# Patient Record
Sex: FEMALE | Race: BLACK OR AFRICAN AMERICAN | Employment: OTHER | URBAN - METROPOLITAN AREA
[De-identification: names, ages, dates, MRNs, and addresses within clinical notes are randomized per-mention and may not be internally consistent; named-entity substitution may affect disease eponyms.]

---

## 2017-03-08 ENCOUNTER — OFFICE VISIT (OUTPATIENT)
Dept: NEUROLOGY | Age: 72
End: 2017-03-08

## 2017-03-08 VITALS
OXYGEN SATURATION: 98 % | BODY MASS INDEX: 22.24 KG/M2 | WEIGHT: 138.4 LBS | SYSTOLIC BLOOD PRESSURE: 122 MMHG | HEART RATE: 73 BPM | DIASTOLIC BLOOD PRESSURE: 68 MMHG | HEIGHT: 66 IN | RESPIRATION RATE: 16 BRPM

## 2017-03-08 DIAGNOSIS — I61.1 NONTRAUMATIC CORTICAL HEMORRHAGE OF LEFT CEREBRAL HEMISPHERE (HCC): Primary | ICD-10-CM

## 2017-03-08 DIAGNOSIS — G40.209 COMPLEX PARTIAL SEIZURE EVOLVING TO GENERALIZED SEIZURE (HCC): ICD-10-CM

## 2017-03-08 DIAGNOSIS — G40.009 LOCALIZATION-RELATED IDIOPATHIC EPILEPSY AND EPILEPTIC SYNDROMES WITH SEIZURES OF LOCALIZED ONSET, NOT INTRACTABLE, WITHOUT STATUS EPILEPTICUS (HCC): ICD-10-CM

## 2017-03-08 NOTE — MR AVS SNAPSHOT
Visit Information Date & Time Provider Department Dept. Phone Encounter #  
 3/8/2017 12:30 PM Tavares Goldman MD [facility] [phone] [id number] Follow-up Instructions Return in about 6 months (around 9/8/2017). Follow-up and Disposition History Your Appointments 1/2/2018  1:00 PM  
PROCEDURE with DOPPLER_NEUMR [facility] [address]) Appt Note: doppler tas patient [facility], 
[address], [address] [place] [address]  
[address], [address], [address] [place] [address]  
  
    
 7/9/2018  3:20 PM  
Follow Up with Tavares Goldman MD  
[facility] [address]) Appt Note: f/u seizure, jrb 12/29/17  
 [facility], 
[address], [address] [place] [address]  
[address], [address] [place] [address] Avenue, [address] [place] [address] Upcoming Health Maintenance Date Due DTaP/Tdap/Td series (1 - Tdap) 1/3/1964 FOBT Q 1 YEAR AGE 50-75 1/3/1993 ZOSTER VACCINE AGE 60> 11/3/2002 GLAUCOMA SCREENING Q2Y 1/3/2008 OSTEOPOROSIS SCREENING (DEXA) 1/3/2008 MEDICARE YEARLY EXAM 1/3/2008 Pneumococcal 65+ Low/Medium Risk (2 of 2 - PCV13) 2/27/2014 Influenza Age 5 to Adult 8/1/2017 Allergies as of 3/8/2017  Review Complete On: 3/8/2017 By: Apryl Retana No Known Allergies Current Immunizations  Reviewed on 3/3/2013 Name Date Pneumococcal Polysaccharide (PPSV-23) 2/27/2013  6:14 PM  
  
 Not reviewed this visit You Were Diagnosed With   
  
 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  
 122/68 73 16 5' 6\" (1.676 m) 138 lb 6.4 oz (62.8 kg) 98% BMI Smoking Status 22.34 kg/m2 Never Smoker Vitals History BMI and BSA Data Body Mass Index Body Surface Area  
 22.34 kg/m 2 1.71 m 2 Preferred Pharmacy Pharmacy Name Phone [address] [facility], [place] [address] Your Updated Medication List  
  
   
This list is accurate as of: 3/8/17 11:59 PM.  Always use your most recent med list.  
  
  
  
  
 atorvastatin 10 mg tablet Commonly known as:  LIPITOR Take 10 mg by mouth daily. HYDROCHLOROTHIAZIDE PO Take 25 mg by mouth.  
  
 levETIRAcetam 1,000 mg tablet Take 1 Tab by mouth two (2) times a day. losartan 50 mg tablet Commonly known as:  COZAAR Take 50 mg by mouth daily. metoprolol succinate 25 mg XL tablet Commonly known as:  TOPROL-XL Take 25 mg by mouth daily. We Performed the Following LEVETIRACETAM (KEPPRA) [id number] CPT(R)] Follow-up Instructions Return in about 6 months (around 9/8/2017). Patient Instructions A Healthy Lifestyle: Care Instructions Your Care Instructions A healthy lifestyle can help you feel good, stay at a healthy weight, and have plenty of energy for both work and play. A healthy lifestyle is something you can share with your whole family. A healthy lifestyle also can lower your risk for serious health problems, such as high blood pressure, heart disease, and diabetes. You can follow a few steps listed below to improve your health and the health of your family. Follow-up care is a key part of your treatment and safety. Be sure to make and go to all appointments, and call your doctor if you are having problems. Its also a good idea to know your test results and keep a list of the medicines you take. How can you care for yourself at home? · Do not eat too much sugar, fat, or fast foods. You can still have dessert and treats now and then. The goal is moderation. · Start small to improve your eating habits. Pay attention to portion sizes, drink less juice and soda pop, and eat more fruits and vegetables. ¨ Eat a healthy amount of food. A 3-ounce serving of meat, for example, is about the size of a deck of cards. Fill the rest of your plate with vegetables and whole grains. ¨ Limit the amount of soda and sports drinks you have every day. Drink more water when you are thirsty. ¨ Eat at least 5 servings of fruits and vegetables every day. It may seem like a lot, but it is not hard to reach this goal. A serving or helping is 1 piece of fruit, 1 cup of vegetables, or 2 cups of leafy, raw vegetables. Have an apple or some carrot sticks as an afternoon snack instead of a candy bar. Try to have fruits and/or vegetables at every meal. 
· Make exercise part of your daily routine. You may want to start with simple activities, such as walking, bicycling, or slow swimming. Try to be active 30 to 60 minutes every day. You do not need to do all 30 to 60 minutes all at once. For example, you can exercise 3 times a day for 10 or 20 minutes. Moderate exercise is safe for most people, but it is always a good idea to talk to your doctor before starting an exercise program. 
· Keep moving. Nixon Jayla the lawn, work in the garden, or [employer]. Take the stairs instead of the elevator at work. · If you smoke, quit. People who smoke have an increased risk for heart attack, stroke, cancer, and other lung illnesses.  Quitting is hard, but there are ways to boost your chance of quitting tobacco for good. ¨ Use nicotine gum, patches, or lozenges. ¨ Ask your doctor about stop-smoking programs and medicines. ¨ Keep trying. In addition to reducing your risk of diseases in the future, you will notice some benefits soon after you stop using tobacco. If you have shortness of breath or asthma symptoms, they will likely get better within a few weeks after you quit. · Limit how much alcohol you drink. Moderate amounts of alcohol (up to 2 drinks a day for men, 1 drink a day for women) are okay. But drinking too much can lead to liver problems, high blood pressure, and other health problems. Family health If you have a family, there are many things you can do together to improve your health. · Eat meals together as a family as often as possible. · Eat healthy foods. This includes fruits, vegetables, lean meats and dairy, and whole grains. · Include your family in your fitness plan. Most people think of activities such as jogging or tennis as the way to fitness, but there are many ways you and your family can be more active. Anything that makes you breathe hard and gets your heart pumping is exercise. Here are some tips: 
¨ Walk to do errands or to take your child to school or the bus. ¨ Go for a family bike ride after dinner instead of watching TV. Where can you learn more? Go to http://nic-juan carlos.info/. Enter C533 in the search box to learn more about \"A Healthy Lifestyle: Care Instructions. \" Current as of: July 26, 2016 Content Version: 11.1 © 9369-0976 Healthwise, Incorporated. Care instructions adapted under license by [employer] (which disclaims liability or warranty for this information). If you have questions about a medical condition or this instruction, always ask your healthcare professional. [place] [address] any warranty or liability for your use of this information. Introducing [facility]! Emerson John introduces Realius patient portal. Now you can access parts of your medical record, email your doctor's office, and request medication refills online. 1. In your internet browser, go to [email or web address] 2. Click on the First Time User? Click Here link in the Sign In box. You will see the New Member Sign Up page. 3. Enter your Realius Access Code exactly as it appears below. You will not need to use this code after youve completed the sign-up process. If you do not sign up before the expiration date, you must request a new code. · Realius Access Code: [email or web address] Expires: 3/29/2018  2:47 PM 
 
4. Enter the last four digits of your Social Security Number (xxxx) and Date of Birth (mm/dd/yyyy) as indicated and click Submit. You will be taken to the next sign-up page. 5. Create a Realius ID. This will be your Realius login ID and cannot be changed, so think of one that is secure and easy to remember. 6. Create a Realius password. You can change your password at any time. 7. Enter your Password Reset Question and Answer. This can be used at a later time if you forget your password. 8. Enter your e-mail address. You will receive e-mail notification when new information is available in [address] 9. Click Sign Up. You can now view and download portions of your medical record. 10. Click the Download Summary menu link to download a portable copy of your medical information. If you have questions, please visit the Frequently Asked Questions section of the Realius website. Remember, Realius is NOT to be used for urgent needs. For medical emergencies, dial 911. Now available from your iPhone and Android! Please provide this summary of care documentation to your next provider. Your primary care clinician is listed as Phys Other. If you have any questions after today's visit, please call [phone].

## 2017-03-08 NOTE — PATIENT INSTRUCTIONS

## 2017-03-08 NOTE — PROGRESS NOTES
Consult  REFERRED BY:  Minor Foster MD    CHIEF COMPLAINT: Seizures      Subjective:     Keysha Winn is a 74 y.o. right-handed -American female seen today for evaluation of new problem of increasing lethargy, slight confusion, feeling a little drowsy and sedated and she wonders whether or not it might be her medicine. 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 She was on 500 mg twice a day. She had a seizure in 2013 after she had a hypertensive intracranial hemorrhage requiring craniotomy and removal of the hematoma. She has a persistent right hemiparesis since then. She had the focal seizures noted then. Her blood pressure has been doing much better and she has had no recurrence of hemorrhages, and she denies any new focal weakness sensory loss visual changes. She has not had any levels done on her Keppra, but the level last May was a little elevated in the 40s, and I think that was before we increased her dose. We need to recheck her levels today to see whether or not she is toxic on her medication. Again she has had no further seizures since last May on the dose of medication now. She has had no other unusual medical problems or illnesses since then either. Past Medical History:   Diagnosis Date    Hypertension     [facility]intrace[facility] 2/23/2013      History reviewed. No pertinent surgical history. Family History   Problem Relation Age of Onset    Stroke Mother     Stroke Sister     Dementia Maternal Aunt       Social History   Substance Use Topics    Smoking status: Never Smoker    Smokeless tobacco: Not on file    Alcohol use Not on file         Current Outpatient Prescriptions:     levETIRAcetam 1,000 mg tablet, Take 1 Tab by mouth two (2) times a day., Disp: 60 Tab, Rfl: 0    losartan (COZAAR) 50 mg tablet, Take 50 mg by mouth daily. , Disp: , Rfl:     atorvastatin (LIPITOR) 10 mg tablet, Take 10 mg by mouth daily. , Disp: , Rfl:     metoprolol succinate (TOPROL-XL) 25 mg XL tablet, Take 25 mg by mouth daily. , Disp: , Rfl:     HYDROCHLOROTHIAZIDE PO, Take 25 mg by mouth., Disp: , Rfl:         No Known Allergies     Review of Systems:  A comprehensive review of systems was negative except for: Constitutional: positive for fatigue and malaise  Musculoskeletal: positive for myalgias, arthralgias and stiff joints  Neurological: positive for seizures, coordination problems, gait problems and weakness   Vitals:    03/08/17 1233   BP: 122/68   Pulse: 73   Resp: 16   SpO2: 98%   Weight: 138 lb 6.4 oz (62.8 kg)   Height: 5' 6\" (1.676 m)     Objective:     I      NEUROLOGICAL EXAM:    Appearance: The patient is well developed, well nourished, provides a coherent history and is in no acute distress. Mental Status: Oriented to time, place and person, and the president, cognitive function is normal for her age and speech is fluent and no aphasia or dysarthria. Mood and affect appropriate. Cranial Nerves:   Intact visual fields. Fundi are benign. THERESE, EOM's full, no nystagmus, no ptosis. Facial sensation is normal. Corneal reflexes are not tested. Facial movement is symmetric. Hearing is normal bilaterally. Palate is midline with normal sternocleidomastoid and trapezius muscles are normal. Tongue is midline. Neck without meningismus or bruits   Motor:  4/5 strength in upper and lower proximal and distal muscles on the left, and 3/5 on the right. Normal bulk and tone on the left, and increased muscle tone with normal bulk on the right. No fasciculations. Reflexes:   Deep tendon reflexes 2+/4 on the right and 1+/4 on the left. No babinski or clonus present   Sensory:   Normal to touch, pinprick and vibration. DSS is intact   Gait:  Abnormal gait as patient walks with a mild spastic right hemiparesis . Tremor:   No tremor noted.    Cerebellar:  No cerebellar signs present. Neurovascular:  Normal heart sounds and regular rhythm, peripheral pulses decreased, and no carotid bruits. Assessment:       ICD-10-CM ICD-9-CM    1. Nontraumatic cortical hemorrhage of left cerebral hemisphere (HCC) I61.1 431 LEVETIRACETAM (KEPPRA)   2. 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 She will continue other medication because she is doing well there. She will check my chart for results of her test, and call us if there is any question. She is encouraged to take her vitamins and vitamin D every day, remain mentally and physically active. We discussed her condition with the patient and her  in detail, chart reviewed, x-rays reviewed, her labs reviewed, and I concluded that there is a real high chance she is probably toxic on Keppra, and needs her medication readjusted  At the levels are not elevated we may need to do other testing because of her symptoms and may be reimage the brain. Follow-up in 3-6 months time or earlier as needed. Signed By: Danielle Briseno MD     March 8, 2017       CC: Minor Foster MD  FAX: None    This note will not be viewable in [address]

## 2017-03-20 LAB — LEVETIRACETAM SERPL-MCNC: 77.8 UG/ML (ref 10–40)

## 2017-09-11 ENCOUNTER — TELEPHONE (OUTPATIENT)
Dept: NEUROLOGY | Age: 72
End: 2017-09-11

## 2017-09-11 NOTE — TELEPHONE ENCOUNTER
Patient was rescheduled as Dr Vinny Wyatt had to cancel the appointment as he will be out of the office that day

## 2017-12-29 ENCOUNTER — OFFICE VISIT (OUTPATIENT)
Dept: NEUROLOGY | Age: 72
End: 2017-12-29

## 2017-12-29 VITALS
WEIGHT: 117 LBS | HEART RATE: 63 BPM | TEMPERATURE: 98 F | DIASTOLIC BLOOD PRESSURE: 60 MMHG | RESPIRATION RATE: 16 BRPM | BODY MASS INDEX: 18.8 KG/M2 | OXYGEN SATURATION: 97 % | SYSTOLIC BLOOD PRESSURE: 128 MMHG | HEIGHT: 66 IN

## 2017-12-29 DIAGNOSIS — R41.3 DISTURBANCE OF MEMORY: ICD-10-CM

## 2017-12-29 DIAGNOSIS — R41.82 ALTERED MENTAL STATUS, UNSPECIFIED ALTERED MENTAL STATUS TYPE: ICD-10-CM

## 2017-12-29 DIAGNOSIS — G40.009 LOCALIZATION-RELATED IDIOPATHIC EPILEPSY AND EPILEPTIC SYNDROMES WITH SEIZURES OF LOCALIZED ONSET, NOT INTRACTABLE, WITHOUT STATUS EPILEPTICUS (HCC): ICD-10-CM

## 2017-12-29 DIAGNOSIS — E55.9 VITAMIN D DEFICIENCY: ICD-10-CM

## 2017-12-29 DIAGNOSIS — E53.8 B12 DEFICIENCY: ICD-10-CM

## 2017-12-29 DIAGNOSIS — G40.209 COMPLEX PARTIAL SEIZURE EVOLVING TO GENERALIZED SEIZURE (HCC): ICD-10-CM

## 2017-12-29 DIAGNOSIS — I61.1 NONTRAUMATIC CORTICAL HEMORRHAGE OF LEFT CEREBRAL HEMISPHERE (HCC): Primary | ICD-10-CM

## 2017-12-29 DIAGNOSIS — E03.4 HYPOTHYROIDISM DUE TO ACQUIRED ATROPHY OF THYROID: ICD-10-CM

## 2017-12-29 DIAGNOSIS — I65.23 BILATERAL CAROTID ARTERY STENOSIS: ICD-10-CM

## 2017-12-29 DIAGNOSIS — G45.1 TRANSIENT ISCHEMIC ATTACK INVOLVING LEFT INTERNAL CAROTID ARTERY: ICD-10-CM

## 2017-12-29 DIAGNOSIS — I63.9 CEREBROVASCULAR ACCIDENT (CVA), UNSPECIFIED MECHANISM (HCC): ICD-10-CM

## 2017-12-29 RX ORDER — ASPIRIN 81 MG/1
TABLET ORAL DAILY
COMMUNITY

## 2017-12-29 NOTE — MR AVS SNAPSHOT
Visit Information Date & Time Provider Department Dept. Phone Encounter #  
 12/29/2017  3:40 PM You Negrete MD [facility] [phone] [id number] Follow-up Instructions Return in about 6 months (around 6/29/2018). Upcoming Health Maintenance Date Due DTaP/Tdap/Td series (1 - Tdap) 1/3/1964 FOBT Q 1 YEAR AGE 50-75 1/3/1993 ZOSTER VACCINE AGE 60> 11/3/2002 GLAUCOMA SCREENING Q2Y 1/3/2008 OSTEOPOROSIS SCREENING (DEXA) 1/3/2008 MEDICARE YEARLY EXAM 1/3/2008 Pneumococcal 65+ Low/Medium Risk (2 of 2 - PCV13) 2/27/2014 Influenza Age 5 to Adult 8/1/2017 Allergies as of 12/29/2017  Review Complete On: 3/8/2017 By: Ras Teresa No Known Allergies Current Immunizations  Reviewed on 3/3/2013 Name Date Pneumococcal Polysaccharide (PPSV-23) 2/27/2013  6:14 PM  
  
 Not reviewed this visit You Were Diagnosed With   
  
 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 
ICD-9-CM: 780.97 Disturbance of memory     ICD-10-CM: R41.3 ICD-9-CM: 780.93 Cerebrovascular accident (CVA), unspecified mechanism ([facility]     ICD-10-CM: I63.9 ICD-9-CM: 434.91 Transient ischemic attack involving left internal carotid artery     ICD-10-CM: G45.1 ICD-9-CM: 435.8 Bilateral carotid artery stenosis     ICD-10-CM: I65.23 ICD-9-CM: 433.10, 433.30 Hypothyroidism due to acquired atrophy of thyroid     ICD-10-CM: E03.4 ICD-9-CM: 244.8, 246.8 Vitamin D deficiency     ICD-10-CM: E55.9 ICD-9-CM: 268.9 B12 deficiency     ICD-10-CM: E53.8 ICD-9-CM: 266.2 Vitals BP Pulse Temp Resp Height(growth percentile) Weight(growth percentile) 128/60 63 98 °F (36.7 °C) 16 5' 6\" (1.676 m) 117 lb (53.1 kg) SpO2 BMI Smoking Status 97% 18.88 kg/m2 Never Smoker Vitals History BMI and BSA Data Body Mass Index Body Surface Area  
 18.88 kg/m 2 1.57 m 2 Preferred Pharmacy Pharmacy Name Phone [address] [facility], [place] [address] Your Updated Medication List  
  
   
This list is accurate as of: 12/29/17  4:04 PM.  Always use your most recent med list.  
  
  
  
  
 aspirin delayed-release 81 mg tablet Take  by mouth daily. atorvastatin 10 mg tablet Commonly known as:  LIPITOR Take 10 mg by mouth daily. HYDROCHLOROTHIAZIDE PO Take 25 mg by mouth.  
  
 levETIRAcetam 1,000 mg tablet Take 1 Tab by mouth two (2) times a day. losartan 50 mg tablet Commonly known as:  COZAAR Take 50 mg by mouth daily. metoprolol succinate 25 mg XL tablet Commonly known as:  TOPROL-XL Take 25 mg by mouth daily. VITAMIN B-12 PO Take  by mouth every seven (7) days. We Performed the Following LEVETIRACETAM (KEPPRA) [id number] CPT(R)] REFERRAL TO PSYCHOLOGY [YFI30 Custom] Comments:  
 Needs neuropsych testing for memory loss TSH 3RD GENERATION [67237 CPT(R)] VITAMIN B12 & FOLATE [26778 CPT(R)] VITAMIN D, 25 HYROXY PANEL [RHG30389 Custom] Follow-up Instructions Return in about 6 months (around 6/29/2018). To-Do List   
 01/04/2018 Imaging:  DUPLEX CAROTID BILATERAL AMB NEURO   
  
 01/08/2018 Neurology:  NEURO EEG 24 HR Referral Information Referral ID Referred By Referred To  
  
 [id number] [address] [place] [address], PHD   
   [address] [place] [address] [address] [place] Phone: [phone] Fax: [phone] Visits Status Start Date End Date 1 New Request 12/29/17 12/29/18 If your referral has a status of pending review or denied, additional information will be sent to support the outcome of this decision. Patient Instructions Please be advised there is a $25 fee for all paperwork to be completed from our  providers. This is to be paid by the patient prior to picking up the completed forms. A Healthy Lifestyle: Care Instructions Your Care Instructions A healthy lifestyle can help you feel good, stay at a healthy weight, and have plenty of energy for both work and play. A healthy lifestyle is something you can share with your whole family. A healthy lifestyle also can lower your risk for serious health problems, such as high blood pressure, heart disease, and diabetes. You can follow a few steps listed below to improve your health and the health of your family. Follow-up care is a key part of your treatment and safety. Be sure to make and go to all appointments, and call your doctor if you are having problems. It's also a good idea to know your test results and keep a list of the medicines you take. How can you care for yourself at home? · Do not eat too much sugar, fat, or fast foods. You can still have dessert and treats now and then. The goal is moderation. · Start small to improve your eating habits. Pay attention to portion sizes, drink less juice and soda pop, and eat more fruits and vegetables. ¨ Eat a healthy amount of food. A 3-ounce serving of meat, for example, is about the size of a deck of cards. Fill the rest of your plate with vegetables and whole grains. ¨ Limit the amount of soda and sports drinks you have every day. Drink more water when you are thirsty. ¨ Eat at least 5 servings of fruits and vegetables every day.  It may seem like a lot, but it is not hard to reach this goal. A serving or helping is 1 piece of fruit, 1 cup of vegetables, or 2 cups of leafy, raw vegetables. Have an apple or some carrot sticks as an afternoon snack instead of a candy bar. Try to have fruits and/or vegetables at every meal. 
· Make exercise part of your daily routine. You may want to start with simple activities, such as walking, bicycling, or slow swimming. Try to be active 30 to 60 minutes every day. You do not need to do all 30 to 60 minutes all at once. For example, you can exercise 3 times a day for 10 or 20 minutes. Moderate exercise is safe for most people, but it is always a good idea to talk to your doctor before starting an exercise program. 
· Keep moving. Al[place] Knuckles the lawn, work in the garden, or J[employer]. Take the stairs instead of the elevator at work. · If you smoke, quit. People who smoke have an increased risk for heart attack, stroke, cancer, and other lung illnesses. Quitting is hard, but there are ways to boost your chance of quitting tobacco for good. ¨ Use nicotine gum, patches, or lozenges. ¨ Ask your doctor about stop-smoking programs and medicines. ¨ Keep trying. In addition to reducing your risk of diseases in the future, you will notice some benefits soon after you stop using tobacco. If you have shortness of breath or asthma symptoms, they will likely get better within a few weeks after you quit. · Limit how much alcohol you drink. Moderate amounts of alcohol (up to 2 drinks a day for men, 1 drink a day for women) are okay. But drinking too much can lead to liver problems, high blood pressure, and other health problems. Family health If you have a family, there are many things you can do together to improve your health. · Eat meals together as a family as often as possible. · Eat healthy foods. This includes fruits, vegetables, lean meats and dairy, and whole grains. · Include your family in your fitness plan. Most people think of activities such as jogging or tennis as the way to fitness, but there are many ways you and your family can be more active. Anything that makes you breathe hard and gets your heart pumping is exercise. Here are some tips: 
¨ Walk to do errands or to take your child to school or the bus. ¨ Go for a family bike ride after dinner instead of watching TV. Where can you learn more? Go to http://nic-juan carlos.info/. Enter S467 in the search box to learn more about \"A Healthy Lifestyle: Care Instructions. \" Current as of: May 12, 2017 Content Version: 11.4 © 1314-1783 [employer]. Care instructions adapted under license by [employer] (which disclaims liability or warranty for this information). If you have questions about a medical condition or this instruction, always ask your healthcare professional. [place] [address] any warranty or liability for your use of this information. Introducing [address]! [facility] introduces Pyreg patient portal. Now you can access parts of your medical record, email your doctor's office, and request medication refills online. 1. In your internet browser, go to [email or web address] 2. Click on the First Time User? Click Here link in the Sign In box. You will see the New Member Sign Up page. 3. Enter your Pyreg Access Code exactly as it appears below. You will not need to use this code after youve completed the sign-up process. If you do not sign up before the expiration date, you must request a new code. · Pyreg Access Code: [email or web address] Expires: 3/29/2018  2:47 PM 
 
4. Enter the last four digits of your Social Security Number (xxxx) and Date of Birth (mm/dd/yyyy) as indicated and click Submit. You will be taken to the next sign-up page. 5. Create a Veeqo ID. This will be your Veeqo login ID and cannot be changed, so think of one that is secure and easy to remember. 6. Create a Veeqo password. You can change your password at any time. 7. Enter your Password Reset Question and Answer. This can be used at a later time if you forget your password. 8. Enter your e-mail address. You will receive e-mail notification when new information is available in [address] 9. Click Sign Up. You can now view and download portions of your medical record. 10. Click the Download Summary menu link to download a portable copy of your medical information. If you have questions, please visit the Frequently Asked Questions section of the Veeqo website. Remember, Veeqo is NOT to be used for urgent needs. For medical emergencies, dial 911. Now available from your iPhone and Android! Please provide this summary of care documentation to your next provider. Your primary care clinician is listed as Phys Other. If you have any questions after today's visit, please call [phone].

## 2017-12-29 NOTE — PATIENT INSTRUCTIONS
Please be advised there is a $25 fee for all paperwork to be completed from our  providers. This is to be paid by the patient prior to picking up the completed forms. A Healthy Lifestyle: Care Instructions  Your Care Instructions    A healthy lifestyle can help you feel good, stay at a healthy weight, and have plenty of energy for both work and play. A healthy lifestyle is something you can share with your whole family. A healthy lifestyle also can lower your risk for serious health problems, such as high blood pressure, heart disease, and diabetes. You can follow a few steps listed below to improve your health and the health of your family. Follow-up care is a key part of your treatment and safety. Be sure to make and go to all appointments, and call your doctor if you are having problems. It's also a good idea to know your test results and keep a list of the medicines you take. How can you care for yourself at home? · Do not eat too much sugar, fat, or fast foods. You can still have dessert and treats now and then. The goal is moderation. · Start small to improve your eating habits. Pay attention to portion sizes, drink less juice and soda pop, and eat more fruits and vegetables. ¨ Eat a healthy amount of food. A 3-ounce serving of meat, for example, is about the size of a deck of cards. Fill the rest of your plate with vegetables and whole grains. ¨ Limit the amount of soda and sports drinks you have every day. Drink more water when you are thirsty. ¨ Eat at least 5 servings of fruits and vegetables every day. It may seem like a lot, but it is not hard to reach this goal. A serving or helping is 1 piece of fruit, 1 cup of vegetables, or 2 cups of leafy, raw vegetables. Have an apple or some carrot sticks as an afternoon snack instead of a candy bar. Try to have fruits and/or vegetables at every meal.  · Make exercise part of your daily routine.  You may want to start with simple activities, such as walking, bicycling, or slow swimming. Try to be active 30 to 60 minutes every day. You do not need to do all 30 to 60 minutes all at once. For example, you can exercise 3 times a day for 10 or 20 minutes. Moderate exercise is safe for most people, but it is always a good idea to talk to your doctor before starting an exercise program.  · Keep moving. [place]al Folk the lawn, work in the garden, or [employer]. Take the stairs instead of the elevator at work. · If you smoke, quit. People who smoke have an increased risk for heart attack, stroke, cancer, and other lung illnesses. Quitting is hard, but there are ways to boost your chance of quitting tobacco for good. ¨ Use nicotine gum, patches, or lozenges. ¨ Ask your doctor about stop-smoking programs and medicines. ¨ Keep trying. In addition to reducing your risk of diseases in the future, you will notice some benefits soon after you stop using tobacco. If you have shortness of breath or asthma symptoms, they will likely get better within a few weeks after you quit. · Limit how much alcohol you drink. Moderate amounts of alcohol (up to 2 drinks a day for men, 1 drink a day for women) are okay. But drinking too much can lead to liver problems, high blood pressure, and other health problems. Family health  If you have a family, there are many things you can do together to improve your health. · Eat meals together as a family as often as possible. · Eat healthy foods. This includes fruits, vegetables, lean meats and dairy, and whole grains. · Include your family in your fitness plan. Most people think of activities such as jogging or tennis as the way to fitness, but there are many ways you and your family can be more active. Anything that makes you breathe hard and gets your heart pumping is exercise. Here are some tips:  ¨ Walk to do errands or to take your child to school or the bus. ¨ Go for a family bike ride after dinner instead of watching TV.   Where can you learn more? Go to http://nic-juan carlos.info/. Enter K283 in the search box to learn more about \"A Healthy Lifestyle: Care Instructions. \"  Current as of: May 12, 2017  Content Version: 11.4  © 7014-0673 [employer]. Care instructions adapted under license by [employer] (which disclaims liability or warranty for this information). If you have questions about a medical condition or this instruction, always ask your healthcare professional. [address] any warranty or liability for your use of this information.

## 2017-12-30 NOTE — PROGRESS NOTES
Consult  REFERRED BY:  Minor Foster MD    CHIEF COMPLAINT: Seizures      Subjective:     Radha Dior is a 74 y.o. right-handed -American female seen today on urgent work in basis for evaluation of new problem of recent spell of sudden confusion and altered mental status and memory loss requiring her to go to [facility] I think in [place] because she cannot quite remember which hospital she went to at the request of  from the emergency room there. The patient apparently became confused, went to the hospital had a workup for TIA, and apparently nothing was found for sure, and she was told she had a TIA. She does remember exactly what test she had done. She also is complaining of increasing blurred vision, and increasing memory loss. 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 She gives a history that her memory has been fading for some time and she probably has a dementia. She really could not even remember which hospital she went to for this recent TIA. She has not had any clear seizures, but one wonders whether her spells of confusion might be related to breakthrough seizures. 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 She was on 500 mg twice a day. She had a seizure in 2013 after she had a hypertensive intracranial hemorrhage requiring craniotomy and removal of the hematoma. She has a persistent right hemiparesis since then. She had the focal seizures noted then. Her blood pressure has been doing much better and she has had no recurrence of hemorrhages, and she denies any new focal weakness sensory loss visual changes.   She has not had any levels done on her Keppra, but the level last May was a little elevated in the 40s, and I think that was before we increased her dose. We need to recheck her levels today to see whether or not she is toxic on her medication. Again she has had no further seizures since last May on the dose of medication now. She has had no other unusual medical problems or illnesses since then either. Past Medical History:   Diagnosis Date    Hypertension     ICH (intracerebral hemorrhage) ([facility] [address] 2/23/2013      History reviewed. No pertinent surgical history. Family History   Problem Relation Age of Onset    Stroke Mother     Stroke Sister     Dementia Maternal Aunt       Social History   Substance Use Topics    Smoking status: Never Smoker    Smokeless tobacco: Never Used    Alcohol use Not on file         Current Outpatient Prescriptions:     CYANOCOBALAMIN, VITAMIN B-12, (VITAMIN B-12 PO), Take  by mouth every seven (7) days. , Disp: , Rfl:     aspirin delayed-release 81 mg tablet, Take  by mouth daily. , Disp: , Rfl:     levETIRAcetam 1,000 mg tablet, Take 1 Tab by mouth two (2) times a day., Disp: 60 Tab, Rfl: 0    losartan (COZAAR) 50 mg tablet, Take 50 mg by mouth daily. , Disp: , Rfl:     atorvastatin (LIPITOR) 10 mg tablet, Take 10 mg by mouth daily. , Disp: , Rfl:     metoprolol succinate (TOPROL-XL) 25 mg XL tablet, Take 25 mg by mouth daily. , Disp: , Rfl:     HYDROCHLOROTHIAZIDE PO, Take 25 mg by mouth., Disp: , Rfl:         No Known Allergies     Review of Systems:  A comprehensive review of systems was negative except for: Constitutional: positive for fatigue and malaise  Musculoskeletal: positive for myalgias, arthralgias and stiff joints  Neurological: positive for seizures, coordination problems, gait problems and weakness   Vitals:    12/29/17 1543   BP: 128/60   Pulse: 63   Resp: 16   Temp: 98 °F (36.7 °C)   SpO2: 97%   Weight: 117 lb (53.1 kg)   Height: 5' 6\" (1.676 m)     Objective:     I      NEUROLOGICAL EXAM:    Appearance: The patient is well developed, well nourished, provides a coherent history and is in no acute distress. Mental Status: Oriented to month and year but misses the day of the month, place and person, and the president, patient cannot remember 1 of 3 words at 30 seconds, has difficulty doing serial sevens, has difficulty spelling world backward, and has difficulty drawing a clock that shows a time 10:50, cognitive function is abnormal for her age and speech is fluent and no aphasia or dysarthria. Mood and affect appropriate, but anxious. Cranial Nerves:    Right homonymous hemianopsia on visual fields. Fundi are benign. THERESE, EOM's full, no nystagmus, no ptosis. Facial sensation is normal. Corneal reflexes are not tested. Facial movement is asymmetric on the right. Hearing is normal bilaterally. Palate is midline with normal sternocleidomastoid and trapezius muscles are normal. Tongue is midline. Neck without meningismus or bruits   Motor:  4/5 strength in upper and lower proximal and distal muscles on the left, and 3/5 on the right. Normal bulk and tone on the left, and increased muscle tone with normal bulk on the right. No fasciculations. Reflexes:   Deep tendon reflexes 2+/4 on the right and 1+/4 on the left. No babinski or clonus present   Sensory:   Normal to touch, pinprick and vibration. DSS is intact   Gait:  Abnormal gait as patient walks with a mild spastic right hemiparesis . Tremor:   No tremor noted. Cerebellar:  No cerebellar signs present. Neurovascular:  Normal heart sounds and regular rhythm, peripheral pulses decreased, and no carotid bruits. Assessment:       ICD-10-CM ICD-9-CM    1.  Nontraumatic cortical hemorrhage of left cerebral hemisphere (HCC) I61.1 431 CYANOCOBALAMIN, VITAMIN B-12, (VITAMIN B-12 PO)      aspirin delayed-release 81 mg tablet      LEVETIRACETAM (KEPPRA)      VITAMIN D, 25 HYROXY PANEL      VITAMIN B12 & FOLATE      TSH 3RD GENERATION      REFERRAL TO PSYCHOLOGY      NEURO EEG 24 HR       DUPLEX CAROTID BILATERAL AMB NEURO   2. Complex partial seizure [facility] G40.209 345.40 CYANOCOBALAMIN, VITAMIN B-12, (VITAMIN B-12 PO)      aspirin delayed-release 81 mg tablet      LEVETIRACETAM (KEPPRA)      VITAMIN D, 25 HYROXY PANEL      VITAMIN B12 & FOLATE      TSH 3RD GENERATION      REFERRAL TO PSYCHOLOGY      NEURO EEG 24 HR       DUPLEX CAROTID BILATERAL AMB NEURO   3. 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 Altered mental status, unspecified altered mental status type R41.82 780.97 CYANOCOBALAMIN, VITAMIN B-12, (VITAMIN B-12 PO)      aspirin delayed-release 81 mg tablet      LEVETIRACETAM (KEPPRA)      VITAMIN D, 25 HYROXY PANEL      VITAMIN B12 & FOLATE      TSH 3RD GENERATION      REFERRAL TO PSYCHOLOGY      NEURO EEG 24 HR       DUPLEX CAROTID BILATERAL AMB NEURO   5. Disturbance of memory R41.3 780.93 CYANOCOBALAMIN, VITAMIN B-12, (VITAMIN B-12 PO)      aspirin delayed-release 81 mg tablet      LEVETIRACETAM (KEPPRA)      VITAMIN D, 25 HYROXY PANEL      VITAMIN B12 & FOLATE      TSH 3RD GENERATION      REFERRAL TO PSYCHOLOGY      NEURO EEG 24 HR       DUPLEX CAROTID BILATERAL AMB NEURO   6.  Cerebrovascular accident (CVA), unspecified mechanism ([facility] I63.9 434.91 CYANOCOBALAMIN, VITAMIN B-12, (VITAMIN B-12 PO)      aspirin delayed-release 81 mg tablet      LEVETIRACETAM (KEPPRA)      VITAMIN D, 25 HYROXY PANEL      VITAMIN B12 & FOLATE      TSH 3RD GENERATION      REFERRAL TO PSYCHOLOGY      NEURO EEG 24 HR       DUPLEX CAROTID BILATERAL AMB NEURO   7. Transient ischemic attack involving left internal carotid artery G45.1 435.8 CYANOCOBALAMIN, VITAMIN B-12, (VITAMIN B-12 PO)      aspirin delayed-release 81 mg tablet      LEVETIRACETAM (KEPPRA)      VITAMIN D, 25 HYROXY PANEL      VITAMIN B12 & FOLATE      TSH 3RD GENERATION      REFERRAL TO PSYCHOLOGY      NEURO EEG 24 HR       DUPLEX CAROTID BILATERAL AMB NEURO   8. Bilateral carotid artery stenosis I65.23 433.10 CYANOCOBALAMIN, VITAMIN B-12, (VITAMIN B-12 PO)     433.30 aspirin delayed-release 81 mg tablet      LEVETIRACETAM (KEPPRA)      VITAMIN D, 25 HYROXY PANEL      VITAMIN B12 & FOLATE      TSH 3RD GENERATION      REFERRAL TO PSYCHOLOGY      NEURO EEG 24 HR       DUPLEX CAROTID BILATERAL AMB NEURO   9. Hypothyroidism due to acquired atrophy of thyroid E03.4 244.8 CYANOCOBALAMIN, VITAMIN B-12, (VITAMIN B-12 PO)     246.8 aspirin delayed-release 81 mg tablet      LEVETIRACETAM (KEPPRA)      VITAMIN D, 25 HYROXY PANEL      VITAMIN B12 & FOLATE      TSH 3RD GENERATION      REFERRAL TO PSYCHOLOGY      NEURO EEG 24 HR       DUPLEX CAROTID BILATERAL AMB NEURO   10. Vitamin D deficiency E55.9 268.9 CYANOCOBALAMIN, VITAMIN B-12, (VITAMIN B-12 PO)      aspirin delayed-release 81 mg tablet      LEVETIRACETAM (KEPPRA)      VITAMIN D, 25 HYROXY PANEL      VITAMIN B12 & FOLATE      TSH 3RD GENERATION      REFERRAL TO PSYCHOLOGY      NEURO EEG 24 HR       DUPLEX CAROTID BILATERAL AMB NEURO   11.  B12 deficiency E53.8 266.2 CYANOCOBALAMIN, VITAMIN B-12, (VITAMIN B-12 PO)      aspirin delayed-release 81 mg tablet      LEVETIRACETAM (KEPPRA)      VITAMIN D, 25 HYROXY PANEL      VITAMIN B12 & FOLATE      TSH 3RD GENERATION      REFERRAL TO PSYCHOLOGY      NEURO EEG 24 HR       DUPLEX CAROTID BILATERAL AMB NEURO         Plan:     Patient with new complaints of recent episode of confusion and possible TIA, need to rule out carotid stenosis, need to rule out stroke, he to rule out breakthrough seizures, need to rule out Alzheimer's disease which is most likely. Patient will get a carotid Doppler study, and get a 24 hour amatory EEG to rule out breakthrough seizures. Complete metabolic panel sent to rule out any treatable cause of her memory loss or dementia. Patient also referred to neuropsych for memory testing and neuropsych testing for dementia  Medication levels checked to make sure she is not toxic on her medications. We will check her levels today, but asked her to keep on the same levels of her dosing now until we decide what to do depending on her level. She will continue other medication   She will check my chart for results of her test, and call us if there is any question. She is encouraged to take her vitamins and vitamin D every day, remain mentally and physically active. We discussed her condition with the patient in detail, chart reviewed, x-rays reviewed, her labs reviewed, and I concluded that there is a real high chance she is probably toxic on Keppra, and needs her medication readjusted  At the levels are not elevated we may need to do other testing because of her symptoms and may be reimage the brain. 40 minutes by the patient, going over her new problems, discussing her diagnosis, prognosis and further treatment and therapy evaluations needed. Follow-up in 3 months time or earlier as needed. Signed By: Anjelica Ag MD     December 29, 2017       CC: Minor Foster MD  FAX: None    This note will not be viewable in [place].

## 2018-01-04 ENCOUNTER — TELEPHONE (OUTPATIENT)
Dept: NEUROLOGY | Age: 73
End: 2018-01-04

## 2018-04-10 ENCOUNTER — TELEPHONE (OUTPATIENT)
Dept: NEUROLOGY | Age: 73
End: 2018-04-10

## 2018-04-10 NOTE — TELEPHONE ENCOUNTER
Patient was late for appt today, and was offered 9:00am appt for tomorrow. Daughter stated that she could not take that slot. Patient was recently d/c'd from [facility] and needs to be seen. Is it possible to get the patient in sooner than Dr. Samuel Fraser next available appt.       [phone]

## 2018-04-18 ENCOUNTER — OFFICE VISIT (OUTPATIENT)
Dept: NEUROLOGY | Age: 73
End: 2018-04-18

## 2018-04-18 DIAGNOSIS — R41.3 DISTURBANCE OF MEMORY: ICD-10-CM

## 2018-04-18 DIAGNOSIS — I65.23 BILATERAL CAROTID ARTERY STENOSIS: ICD-10-CM

## 2018-04-18 DIAGNOSIS — G40.009 LOCALIZATION-RELATED IDIOPATHIC EPILEPSY AND EPILEPTIC SYNDROMES WITH SEIZURES OF LOCALIZED ONSET, NOT INTRACTABLE, WITHOUT STATUS EPILEPTICUS (HCC): ICD-10-CM

## 2018-04-18 DIAGNOSIS — G40.209 COMPLEX PARTIAL SEIZURE EVOLVING TO GENERALIZED SEIZURE (HCC): Primary | ICD-10-CM

## 2018-04-18 DIAGNOSIS — G45.1 TRANSIENT ISCHEMIC ATTACK INVOLVING LEFT INTERNAL CAROTID ARTERY: ICD-10-CM

## 2018-04-18 RX ORDER — LEVETIRACETAM 500 MG/1
1500 TABLET ORAL 2 TIMES DAILY
Qty: 540 TAB | Refills: 5 | Status: SHIPPED | OUTPATIENT
Start: 2018-04-18

## 2018-04-18 RX ORDER — BISMUTH SUBSALICYLATE 262 MG
1 TABLET,CHEWABLE ORAL DAILY
COMMUNITY

## 2018-04-18 NOTE — PATIENT INSTRUCTIONS
Office Policies       Phone calls/patient messages:   Please allow up to 24 hours for someone in the office to contact you about your call or message. Be mindful your provider may be out of the office or your message may require further review. We encourage you to use [employer] for your messages as this is a faster, more efficient way to communicate with our office         Medication Refills:   Prescription medications require 48 business hours to process. We encourage you to use [employer] for your refills. For controlled medications: Please allow 72 business hours to process. Certain medications may require you to  a written prescription at our office. NO narcotic/controlled medications will be prescribed after 4pm Monday through Friday or on weekends     Form/Paperwork Completion:   Please note there is a $25 fee for all paperwork completed by our providers. We ask that you allow 7-14 business days. Pre-payment is due prior to picking up/faxing the completed form. You may also download your forms to [employer] to have your doctor print off. A Healthy Lifestyle: Care Instructions  Your Care Instructions    A healthy lifestyle can help you feel good, stay at a healthy weight, and have plenty of energy for both work and play. A healthy lifestyle is something you can share with your whole family. A healthy lifestyle also can lower your risk for serious health problems, such as high blood pressure, heart disease, and diabetes. You can follow a few steps listed below to improve your health and the health of your family. Follow-up care is a key part of your treatment and safety. Be sure to make and go to all appointments, and call your doctor if you are having problems. It's also a good idea to know your test results and keep a list of the medicines you take. How can you care for yourself at home? · Do not eat too much sugar, fat, or fast foods. You can still have dessert and treats now and then.  The goal is moderation. · Start small to improve your eating habits. Pay attention to portion sizes, drink less juice and soda pop, and eat more fruits and vegetables. ¨ Eat a healthy amount of food. A 3-ounce serving of meat, for example, is about the size of a deck of cards. Fill the rest of your plate with vegetables and whole grains. ¨ Limit the amount of soda and sports drinks you have every day. Drink more water when you are thirsty. ¨ Eat at least 5 servings of fruits and vegetables every day. It may seem like a lot, but it is not hard to reach this goal. A serving or helping is 1 piece of fruit, 1 cup of vegetables, or 2 cups of leafy, raw vegetables. Have an apple or some carrot sticks as an afternoon snack instead of a candy bar. Try to have fruits and/or vegetables at every meal.  · Make exercise part of your daily routine. You may want to start with simple activities, such as walking, bicycling, or slow swimming. Try to be active 30 to 60 minutes every day. You do not need to do all 30 to 60 minutes all at once. For example, you can exercise 3 times a day for 10 or 20 minutes. Moderate exercise is safe for most people, but it is always a good idea to talk to your doctor before starting an exercise program.  · Keep moving. Kwan Avila the lawn, work in the garden, or Fly me to the Moon. Take the stairs instead of the elevator at work. · If you smoke, quit. People who smoke have an increased risk for heart attack, stroke, cancer, and other lung illnesses. Quitting is hard, but there are ways to boost your chance of quitting tobacco for good. ¨ Use nicotine gum, patches, or lozenges. ¨ Ask your doctor about stop-smoking programs and medicines. ¨ Keep trying. In addition to reducing your risk of diseases in the future, you will notice some benefits soon after you stop using tobacco. If you have shortness of breath or asthma symptoms, they will likely get better within a few weeks after you quit.   · Limit how much alcohol you drink. Moderate amounts of alcohol (up to 2 drinks a day for men, 1 drink a day for women) are okay. But drinking too much can lead to liver problems, high blood pressure, and other health problems. Family health  If you have a family, there are many things you can do together to improve your health. · Eat meals together as a family as often as possible. · Eat healthy foods. This includes fruits, vegetables, lean meats and dairy, and whole grains. · Include your family in your fitness plan. Most people think of activities such as jogging or tennis as the way to fitness, but there are many ways you and your family can be more active. Anything that makes you breathe hard and gets your heart pumping is exercise. Here are some tips:  ¨ Walk to do errands or to take your child to school or the bus. ¨ Go for a family bike ride after dinner instead of watching TV. Where can you learn more? Go to http://nicLocalistjuan carlos.info/. Enter M759 in the search box to learn more about \"A Healthy Lifestyle: Care Instructions. \"  Current as of: May 12, 2017  Content Version: 11.4  © 7190-5413 [employer]. Care instructions adapted under license by [employer] (which disclaims liability or warranty for this information). If you have questions about a medical condition or this instruction, always ask your healthcare professional. [address] any warranty or liability for your use of this information.

## 2018-04-18 NOTE — PROGRESS NOTES
Consult  REFERRED BY:  Minor Foster MD    CHIEF COMPLAINT: Seizures      Subjective:     Dayo Hogan is a 74 y.o. right-handed -American female seen today on urgent work in basis for evaluation of new problem of recent spell of sudden confusion and altered mental status and memory loss requiring her to go to [facility] after she became confused at home, had difficulty expressing herself, and seem not to be able to follow commands, and the EMS thought she was having a stroke and ruptured to the emergency room at [facility] in [facility], and the physician there thought he saw some type of possible seizure activity on the left side, and the admitted her to the hospital for further evaluation her MRI scan did not show a new stroke, her spell lasted about 20-45 minutes according to the daughter, and she came out of it remained stable afterwards. They are not sure all the tests that were done, but she was discharged with a diagnosis of probable seizure, and her Keppra dose was increased from 1000 mg twice a day to 1500 mg twice a day. She is tolerating the medication well since then, had no side effects so far, no recurrent seizures, and is seen by us today for further evaluation. They never did an EEG there. We will check a 24 hour amatory EEG and check her levels today, she has been on the medication now for about 4 days. 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 She gives a history that her memory has been fading for some time and she probably has a dementia. She really could not even remember which hospital she went to for this recent TIA. She has not had any clear seizures, but one wonders whether her spells of confusion might be related to breakthrough seizures.   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 She was on 500 mg twice a day. She had a seizure in 2013 after she had a hypertensive intracranial hemorrhage requiring craniotomy and removal of the hematoma. She has a persistent right hemiparesis since then. She had the focal seizures noted then. Her blood pressure has been doing much better and she has had no recurrence of hemorrhages, and she denies any new focal weakness sensory loss visual changes. She has not had any levels done on her Keppra, but the level last May was a little elevated in the 40s, and I think that was before we increased her dose. We need to recheck her levels today to see whether or not she is toxic on her medication. Again she has had no further seizures since last May on the dose of medication now. She has had no other unusual medical problems or illnesses since then either. Past Medical History:   Diagnosis Date    Hypertension     ICH (intracerebral hemorrhage) ([facility] 2/23/2013      History reviewed. No pertinent surgical history. Family History   Problem Relation Age of Onset    Stroke Mother     Stroke Sister     Dementia Maternal Aunt       Social History   Substance Use Topics    Smoking status: Never Smoker    Smokeless tobacco: Never Used    Alcohol use No         Current Outpatient Prescriptions:     multivitamin (ONE A DAY) tablet, Take 1 Tab by mouth daily. , Disp: , Rfl:     levETIRAcetam (KEPPRA) 500 mg tablet, Take 3 Tabs by mouth two (2) times a day., Disp: 540 Tab, Rfl: 5    aspirin delayed-release 81 mg tablet, Take  by mouth daily. , Disp: , Rfl:     atorvastatin (LIPITOR) 10 mg tablet, Take 10 mg by mouth daily. , Disp: , Rfl:     metoprolol succinate (TOPROL-XL) 25 mg XL tablet, Take 25 mg by mouth daily. , Disp: , Rfl:     HYDROCHLOROTHIAZIDE PO, Take 25 mg by mouth daily. , Disp: , Rfl:   CYANOCOBALAMIN, VITAMIN B-12, (VITAMIN B-12 PO), Take  by mouth every seven (7) days. , Disp: , Rfl:     losartan (COZAAR) 50 mg tablet, Take 50 mg by mouth daily. , Disp: , Rfl:         No Known Allergies     Review of Systems:  A comprehensive review of systems was negative except for: Constitutional: positive for fatigue and malaise  Musculoskeletal: positive for myalgias, arthralgias and stiff joints  Neurological: positive for seizures, coordination problems, gait problems and weakness   There were no vitals filed for this visit. Objective:     I      NEUROLOGICAL EXAM:    Appearance: The patient is well developed, well nourished, provides a fair history and is in no acute distress. Mental Status: Oriented to month and year but misses the day of the month, place and person, and the president, patient cannot remember 1 of 3 words at 30 seconds, has difficulty doing serial sevens, has difficulty spelling world backward, and has difficulty drawing a clock that shows a time 10:50, cognitive function is abnormal for her age and speech is fluent and no aphasia or dysarthria. Mood and affect appropriate, but anxious. Cranial Nerves:    Right homonymous hemianopsia on visual fields. Fundi are benign. THERESE, EOM's full, no nystagmus, no ptosis. Facial sensation is normal. Corneal reflexes are not tested. Facial movement is asymmetric on the right. Hearing is normal bilaterally. Palate is midline with normal sternocleidomastoid and trapezius muscles are normal. Tongue is midline. Neck without meningismus or bruits  Temporal arteries are not tender or enlarged   Motor:  4/5 strength in upper and lower proximal and distal muscles on the left, and 3/5 on the right. Normal bulk and tone on the left, and increased muscle tone with normal bulk on the right. No fasciculations. Reflexes:   Deep tendon reflexes 2+/4 on the right and 1+/4 on the left.   No babinski or clonus present   Sensory:   Normal to touch, pinprick and vibration. DSS is intact   Gait:  Abnormal gait as patient walks with a mild spastic right hemiparesis . Tremor:   No tremor noted. Cerebellar:  No cerebellar signs present. Neurovascular:  Normal heart sounds and regular rhythm, peripheral pulses decreased, and no carotid bruits. Assessment:       ICD-10-CM ICD-9-CM    1. 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 Disturbance of memory R41.3 780.93 multivitamin (ONE A DAY) tablet      levETIRAcetam (KEPPRA) 500 mg tablet      LEVETIRACETAM (KEPPRA)      NEURO EEG 24 HR    4. Transient ischemic attack involving left internal carotid artery G45.1 435.8 multivitamin (ONE A DAY) tablet      levETIRAcetam (KEPPRA) 500 mg tablet      LEVETIRACETAM (KEPPRA)      NEURO EEG 24 HR    5. Bilateral carotid artery stenosis I65.23 433.10 multivitamin (ONE A DAY) tablet     433.30 levETIRAcetam (KEPPRA) 500 mg tablet      LEVETIRACETAM (KEPPRA)      NEURO EEG 24 HR          Plan:     Patient with new complaints of recent episode of confusion and possible seizures versus TIA, need to rule out carotid stenosis, need to rule out stroke, he to rule out breakthrough seizures, need to rule out Alzheimer's disease which is most likely. Patient had a normal carotid Doppler study in December, and get a 24 hour amatory EEG to rule out breakthrough seizures. Complete metabolic panel done last visit and in the hospital in [facility] ruled out any treatable cause of her memory loss or dementia.     Patient also referred to neuropsych for memory testing and neuropsych testing for dementia  Medication levels checked to make sure she is not toxic on her medications. She will continue other medication   She will check my chart for results of her test, and call us if there is any question. She is encouraged to take her vitamins and vitamin D every day, remain mentally and physically active. We discussed her condition with the patient in detail, chart reviewed, x-rays reviewed, her labs reviewed,   40 minutes by the patient and family, going over her new problems, discussing her diagnosis, prognosis and further treatment and therapy evaluations needed. Follow-up in 3 months time or earlier as needed. Signed By: Georges Landon MD     April 18, 2018       CC: Minor Foster MD  FAX: None    This note will not be viewable in [address]

## 2018-04-18 NOTE — MR AVS SNAPSHOT
[address], 
[address], [address] [facility] 
[phone] Patient: Eliazar Lazo MRN: [id number] OUJ:[id number] Visit Information Date & Time Provider Department Dept. Phone Encounter #  
 4/18/2018 12:00 PM Dm Hernandez MD Neurology Clinic at [facility] [phone] [id number] Follow-up Instructions Return in about 6 months (around 10/18/2018). Your Appointments 7/9/2018  3:20 PM  
Follow Up with Dm Hernandez MD  
Neurology Clinic at [facility] Appt Note: f/u seizure, jrb 12/29/17  
 [address] [place] [address], 
[address] [place] [address], [address] [phone]  
[address] [facility], [address] [place] [address], [address] [phone] Upcoming Health Maintenance Date Due DTaP/Tdap/Td series (1 - Tdap) 1/3/1964 ZOSTER VACCINE AGE 60> 11/3/2002 GLAUCOMA SCREENING Q2Y 1/3/2008 Bone Densitometry (Dexa) Screening 1/3/2008 Pneumococcal 65+ Low/Medium Risk (2 of 2 - PCV13) 2/27/2014 Influenza Age 5 to Adult 8/1/2017 MEDICARE YEARLY EXAM 3/14/2018 Allergies as of 4/18/2018  Review Complete On: 4/18/2018 By: Pete Coburn No Known Allergies Current Immunizations  Reviewed on 3/3/2013 Name Date Pneumococcal Polysaccharide (PPSV-23) 2/27/2013  6:14 PM  
  
 Not reviewed this visit You Were Diagnosed With   
  
 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  
  
   
This list is accurate as of 4/18/18 12:27 PM.  Always use your most recent med list.  
  
  
  
  
 aspirin delayed-release 81 mg tablet Take  by mouth daily. atorvastatin 10 mg tablet Commonly known as:  LIPITOR Take 10 mg by mouth daily. HYDROCHLOROTHIAZIDE PO Take 25 mg by mouth daily. levETIRAcetam 500 mg tablet Commonly known as:  KEPPRA Take 3 Tabs by mouth two (2) times a day. losartan 50 mg tablet Commonly known as:  COZAAR Take 50 mg by mouth daily. metoprolol succinate 25 mg XL tablet Commonly known as:  TOPROL-XL Take 25 mg by mouth daily. multivitamin tablet Commonly known as:  ONE A DAY Take 1 Tab by mouth daily. VITAMIN B-12 PO Take  by mouth every seven (7) days. Prescriptions Sent to Pharmacy Refills  
 levETIRAcetam (KEPPRA) 500 mg tablet 5 Sig: Take 3 Tabs by mouth two (2) times a day. Class: Normal  
 Pharmacy: [address] Ph #: [phone] Route: Oral  
  
We Performed the Following LEVETIRACETAM (KEPPRA) [id number] CPT(R)] Follow-up Instructions Return in about 6 months (around 10/18/2018). To-Do List   
 04/24/2018 Neurology:  NEURO EEG 24 HR Patient Instructions Office Policies Phone calls/patient messages: Please allow up to 24 hours for someone in the office to contact you about your call or message. Be mindful your provider may be out of the office or your message may require further review.  We encourage you to use [facility] for your messages as this is a faster, more efficient way to communicate with our office Medication Refills: 
 Prescription medications require 48 business hours to process. We encourage you to use [facility] for your refills. For controlled medications: Please allow 72 business hours to process. Certain medications may require you to  a written prescription at our office. NO narcotic/controlled medications will be prescribed after 4pm Monday through Friday or on weekends Form/Paperwork Completion: 
 Please note there is a $25 fee for all paperwork completed by our providers. We ask that you allow 7-14 business days. Pre-payment is due prior to picking up/faxing the completed form. You may also download your forms to [facility] to have your doctor print off. A Healthy Lifestyle: Care Instructions Your Care Instructions A healthy lifestyle can help you feel good, stay at a healthy weight, and have plenty of energy for both work and play. A healthy lifestyle is something you can share with your whole family. A healthy lifestyle also can lower your risk for serious health problems, such as high blood pressure, heart disease, and diabetes. You can follow a few steps listed below to improve your health and the health of your family. Follow-up care is a key part of your treatment and safety. Be sure to make and go to all appointments, and call your doctor if you are having problems. It's also a good idea to know your test results and keep a list of the medicines you take. How can you care for yourself at home? · Do not eat too much sugar, fat, or fast foods. You can still have dessert and treats now and then. The goal is moderation. · Start small to improve your eating habits. Pay attention to portion sizes, drink less juice and soda pop, and eat more fruits and vegetables. ¨ Eat a healthy amount of food.  A 3-ounce serving of meat, for example, is about the size of a deck of cards. Fill the rest of your plate with vegetables and whole grains. ¨ Limit the amount of soda and sports drinks you have every day. Drink more water when you are thirsty. ¨ Eat at least 5 servings of fruits and vegetables every day. It may seem like a lot, but it is not hard to reach this goal. A serving or helping is 1 piece of fruit, 1 cup of vegetables, or 2 cups of leafy, raw vegetables. Have an apple or some carrot sticks as an afternoon snack instead of a candy bar. Try to have fruits and/or vegetables at every meal. 
· Make exercise part of your daily routine. You may want to start with simple activities, such as walking, bicycling, or slow swimming. Try to be active 30 to 60 minutes every day. You do not need to do all 30 to 60 minutes all at once. For example, you can exercise 3 times a day for 10 or 20 minutes. Moderate exercise is safe for most people, but it is always a good idea to talk to your doctor before starting an exercise program. 
· Keep moving. Natanael Em the lawn, work in the garden, or [employer]. Take the stairs instead of the elevator at work. · If you smoke, quit. People who smoke have an increased risk for heart attack, stroke, cancer, and other lung illnesses. Quitting is hard, but there are ways to boost your chance of quitting tobacco for good. ¨ Use nicotine gum, patches, or lozenges. ¨ Ask your doctor about stop-smoking programs and medicines. ¨ Keep trying. In addition to reducing your risk of diseases in the future, you will notice some benefits soon after you stop using tobacco. If you have shortness of breath or asthma symptoms, they will likely get better within a few weeks after you quit. · Limit how much alcohol you drink. Moderate amounts of alcohol (up to 2 drinks a day for men, 1 drink a day for women) are okay. But drinking too much can lead to liver problems, high blood pressure, and other health problems. Family health If you have a family, there are many things you can do together to improve your health. · Eat meals together as a family as often as possible. · Eat healthy foods. This includes fruits, vegetables, lean meats and dairy, and whole grains. · Include your family in your fitness plan. Most people think of activities such as jogging or tennis as the way to fitness, but there are many ways you and your family can be more active. Anything that makes you breathe hard and gets your heart pumping is exercise. Here are some tips: 
¨ Walk to do errands or to take your child to school or the bus. ¨ Go for a family bike ride after dinner instead of watching TV. Where can you learn more? Go to http://nic-juan carlos.info/. Enter I552 in the search box to learn more about \"A Healthy Lifestyle: Care Instructions. \" Current as of: May 12, 2017 Content Version: 11.4 © 5936-9860 [employer]. Care instructions adapted under license by [employer] (which disclaims liability or warranty for this information). If you have questions about a medical condition or this instruction, always ask your healthcare professional. [place] [address] any warranty or liability for your use of this information. Introducing [facility]! Chintan Lacy introduces auctionPAL patient portal. Now you can access parts of your medical record, email your doctor's office, and request medication refills online. 1. In your internet browser, go to [email or web address] 2. Click on the First Time User? Click Here link in the Sign In box. You will see the New Member Sign Up page. 3. Enter your auctionPAL Access Code exactly as it appears below. You will not need to use this code after youve completed the sign-up process. If you do not sign up before the expiration date, you must request a new code. · auctionPAL Access Code: [email or web address] Expires: 7/17/2018 12:02 PM 
 
 4. Enter the last four digits of your Social Security Number (xxxx) and Date of Birth (mm/dd/yyyy) as indicated and click Submit. You will be taken to the next sign-up page. 5. Create a Omnidrive ID. This will be your [employer] login ID and cannot be changed, so think of one that is secure and easy to remember. 6. Create a [employer] password. You can change your password at any time. 7. Enter your Password Reset Question and Answer. This can be used at a later time if you forget your password. 8. Enter your e-mail address. You will receive e-mail notification when new information is available in [address] 9. Click Sign Up. You can now view and download portions of your medical record. 10. Click the Download Summary menu link to download a portable copy of your medical information. If you have questions, please visit the Frequently Asked Questions section of the [employer] website. Remember, O[employer]rive is NOT to be used for urgent needs. For medical emergencies, dial 911. Now available from your iPhone and Android! Please provide this summary of care documentation to your next provider. Your primary care clinician is listed as Phys Other. If you have any questions after today's visit, please call [phone].